# Patient Record
Sex: FEMALE
[De-identification: names, ages, dates, MRNs, and addresses within clinical notes are randomized per-mention and may not be internally consistent; named-entity substitution may affect disease eponyms.]

---

## 2019-03-02 ENCOUNTER — HOSPITAL ENCOUNTER (EMERGENCY)
Dept: HOSPITAL 88 - FSED | Age: 24
Discharge: HOME | End: 2019-03-02
Payer: COMMERCIAL

## 2019-03-02 VITALS — SYSTOLIC BLOOD PRESSURE: 105 MMHG | DIASTOLIC BLOOD PRESSURE: 77 MMHG

## 2019-03-02 VITALS — HEIGHT: 61 IN | WEIGHT: 163.37 LBS | BODY MASS INDEX: 30.84 KG/M2

## 2019-03-02 DIAGNOSIS — M54.2: Primary | ICD-10-CM
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
## 2019-03-02 DIAGNOSIS — S20.219A: ICD-10-CM

## 2019-03-02 DIAGNOSIS — M25.512: ICD-10-CM

## 2019-03-02 DIAGNOSIS — S16.1XXA: ICD-10-CM
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
## 2019-03-02 DIAGNOSIS — S63.522A: ICD-10-CM

## 2019-03-02 PROCEDURE — 81025 URINE PREGNANCY TEST: CPT

## 2019-03-02 PROCEDURE — 72040 X-RAY EXAM NECK SPINE 2-3 VW: CPT

## 2019-03-02 PROCEDURE — 71045 X-RAY EXAM CHEST 1 VIEW: CPT

## 2019-03-02 PROCEDURE — 99283 EMERGENCY DEPT VISIT LOW MDM: CPT
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
## 2019-03-02 NOTE — XMS REPORT
Patient Summary Document

                             Created on: 2019



DANISH VEGA

External Reference #: [id number]

: 1995

Sex: Female



Demographics







                          Address                   [address] [place]  [address]

 

                          Home Phone                [phone]

 

                          Preferred Language        Unknown

 

                          Marital Status            Unknown

 

                          [employer] Affiliation     Unknown

 

                          Race                      Unknown

 

                          Ethnic Group              Unknown





Author







                          Author                    [facility]

 

                          Address                   Unknown

 

                          Phone                     Unavailable







Support







                Name            Relationship    Address         Phone

 

                    ANABEL PENG    PRS                 [address] [place]  [address]                  [phone]

 

                    ANABEL BENITEZ        PRS                 [address] [place]  [address]                  [phone]







Care Team Providers







                    Care Team Member Name    Role                Phone

 

                          Unavailable               Unavailable







Payers







             Payer Name    Policy Type    Policy Number    Effective Date    Expiration Date







Problems

This patient has no known problems.



Allergies, Adverse Reactions, Alerts







          Allergy Name    Allergy Type    Status    Severity    Reaction(s)    Onset Date    Inactive 

Date                      Treating Clinician        Comments

 

        No Known Allergies    DA      Active    U               2018 00:00:00                     

 

        No Known Drug Intolerances    DA      Active    SV              2009-10-01 00:00:00                     

 

        No Known Intolerances    DA      Active    U               2009-10-01 00:00:00                     

 

        No Known Contrast Allergies    DA      Active    U               2008 00:00:00                     

 

        No Known Drug Allergies    DA      Active    U               2008 00:00:00                     

 

        No Known Food Allergies    DA      Active    U               2008 00:00:00                     

 

        No Known Other Allergies    DA      Active    U               2008 00:00:00                     







Medications

This patient has no known medications.

## 2022-01-11 ENCOUNTER — HOSPITAL ENCOUNTER (INPATIENT)
Dept: HOSPITAL 88 - ER | Age: 27
LOS: 3 days | Discharge: HOME | DRG: 419 | End: 2022-01-14
Attending: INTERNAL MEDICINE | Admitting: INTERNAL MEDICINE
Payer: COMMERCIAL

## 2022-01-11 VITALS — SYSTOLIC BLOOD PRESSURE: 111 MMHG | DIASTOLIC BLOOD PRESSURE: 65 MMHG

## 2022-01-11 VITALS — DIASTOLIC BLOOD PRESSURE: 65 MMHG | SYSTOLIC BLOOD PRESSURE: 111 MMHG

## 2022-01-11 VITALS — WEIGHT: 196 LBS | HEIGHT: 62 IN | BODY MASS INDEX: 36.07 KG/M2

## 2022-01-11 VITALS — DIASTOLIC BLOOD PRESSURE: 79 MMHG | SYSTOLIC BLOOD PRESSURE: 120 MMHG

## 2022-01-11 VITALS — SYSTOLIC BLOOD PRESSURE: 120 MMHG | DIASTOLIC BLOOD PRESSURE: 79 MMHG

## 2022-01-11 VITALS — SYSTOLIC BLOOD PRESSURE: 116 MMHG | DIASTOLIC BLOOD PRESSURE: 75 MMHG

## 2022-01-11 DIAGNOSIS — D51.9: ICD-10-CM

## 2022-01-11 DIAGNOSIS — E66.01: ICD-10-CM

## 2022-01-11 DIAGNOSIS — D52.9: ICD-10-CM

## 2022-01-11 DIAGNOSIS — E86.0: ICD-10-CM

## 2022-01-11 DIAGNOSIS — D64.9: ICD-10-CM

## 2022-01-11 DIAGNOSIS — K80.10: Primary | ICD-10-CM

## 2022-01-11 DIAGNOSIS — Z20.822: ICD-10-CM

## 2022-01-11 LAB
ALBUMIN SERPL-MCNC: 4 G/DL (ref 3.5–5)
ALBUMIN/GLOB SERPL: 1 {RATIO} (ref 0.8–2)
ALP SERPL-CCNC: 128 IU/L (ref 40–150)
ALT SERPL-CCNC: 149 IU/L (ref 0–55)
AMYLASE SERPL-CCNC: 48 U/L (ref 25–125)
ANION GAP SERPL CALC-SCNC: 15 MMOL/L (ref 8–16)
BACTERIA URNS QL MICRO: (no result) /HPF
BASOPHILS # BLD AUTO: 0.1 10*3/UL (ref 0–0.1)
BASOPHILS NFR BLD AUTO: 0.7 % (ref 0–1)
BUN SERPL-MCNC: 11 MG/DL (ref 7–26)
BUN/CREAT SERPL: 13 (ref 6–25)
CALCIUM SERPL-MCNC: 9.4 MG/DL (ref 8.4–10.2)
CHLORIDE SERPL-SCNC: 106 MMOL/L (ref 98–107)
CLARITY UR: (no result)
CO2 SERPL-SCNC: 23 MMOL/L (ref 22–29)
COLOR UR: (no result)
DEPRECATED NEUTROPHILS # BLD AUTO: 7 10*3/UL (ref 2.1–6.9)
DEPRECATED RBC URNS MANUAL-ACNC: (no result) /HPF (ref 0–5)
EGFRCR SERPLBLD CKD-EPI 2021: 81 ML/MIN (ref 60–?)
EOSINOPHIL # BLD AUTO: 0.2 10*3/UL (ref 0–0.4)
EOSINOPHIL NFR BLD AUTO: 1.8 % (ref 0–6)
EPI CELLS URNS QL MICRO: (no result) /LPF
ERYTHROCYTE [DISTWIDTH] IN CORD BLOOD: 13.5 % (ref 11.7–14.4)
GLOBULIN PLAS-MCNC: 4 G/DL (ref 2.3–3.5)
GLUCOSE SERPLBLD-MCNC: 91 MG/DL (ref 74–118)
HCT VFR BLD AUTO: 40.3 % (ref 34.2–44.1)
HGB BLD-MCNC: 12.7 G/DL (ref 12–16)
KETONES UR QL STRIP.AUTO: (no result)
LEUKOCYTE ESTERASE UR QL STRIP.AUTO: NEGATIVE
LIPASE SERPL-CCNC: 31 U/L (ref 8–78)
LYMPHOCYTES # BLD: 2.4 10*3/UL (ref 1–3.2)
LYMPHOCYTES NFR BLD AUTO: 23 % (ref 18–39.1)
MCH RBC QN AUTO: 27.1 PG (ref 28–32)
MCHC RBC AUTO-ENTMCNC: 31.5 G/DL (ref 31–35)
MCV RBC AUTO: 85.9 FL (ref 81–99)
MONOCYTES # BLD AUTO: 0.8 10*3/UL (ref 0.2–0.8)
MONOCYTES NFR BLD AUTO: 7.7 % (ref 4.4–11.3)
MUCOUS THREADS URNS QL MICRO: (no result)
NEUTS SEG NFR BLD AUTO: 66.5 % (ref 38.7–80)
NITRITE UR QL STRIP.AUTO: NEGATIVE
PLATELET # BLD AUTO: 348 X10E3/UL (ref 140–360)
POTASSIUM SERPL-SCNC: 4 MMOL/L (ref 3.5–5.1)
PROT UR QL STRIP.AUTO: (no result)
RBC # BLD AUTO: 4.69 X10E6/UL (ref 3.6–5.1)
SODIUM SERPL-SCNC: 140 MMOL/L (ref 136–145)
SP GR UR STRIP: 1.02 (ref 1.01–1.02)
UROBILINOGEN UR STRIP-MCNC: 2 MG/DL (ref 0.2–1)
WBC #/AREA URNS HPF: (no result) /HPF (ref 0–5)

## 2022-01-11 PROCEDURE — 81001 URINALYSIS AUTO W/SCOPE: CPT

## 2022-01-11 PROCEDURE — 74181 MRI ABDOMEN W/O CONTRAST: CPT

## 2022-01-11 PROCEDURE — 71260 CT THORAX DX C+: CPT

## 2022-01-11 PROCEDURE — 74018 RADEX ABDOMEN 1 VIEW: CPT

## 2022-01-11 PROCEDURE — 84702 CHORIONIC GONADOTROPIN TEST: CPT

## 2022-01-11 PROCEDURE — 96361 HYDRATE IV INFUSION ADD-ON: CPT

## 2022-01-11 PROCEDURE — 88304 TISSUE EXAM BY PATHOLOGIST: CPT

## 2022-01-11 PROCEDURE — 93005 ELECTROCARDIOGRAM TRACING: CPT

## 2022-01-11 PROCEDURE — 85025 COMPLETE CBC W/AUTO DIFF WBC: CPT

## 2022-01-11 PROCEDURE — 76705 ECHO EXAM OF ABDOMEN: CPT

## 2022-01-11 PROCEDURE — 83540 ASSAY OF IRON: CPT

## 2022-01-11 PROCEDURE — 83690 ASSAY OF LIPASE: CPT

## 2022-01-11 PROCEDURE — 84466 ASSAY OF TRANSFERRIN: CPT

## 2022-01-11 PROCEDURE — 84484 ASSAY OF TROPONIN QUANT: CPT

## 2022-01-11 PROCEDURE — 99284 EMERGENCY DEPT VISIT MOD MDM: CPT

## 2022-01-11 PROCEDURE — 82746 ASSAY OF FOLIC ACID SERUM: CPT

## 2022-01-11 PROCEDURE — 36415 COLL VENOUS BLD VENIPUNCTURE: CPT

## 2022-01-11 PROCEDURE — 85045 AUTOMATED RETICULOCYTE COUNT: CPT

## 2022-01-11 PROCEDURE — 0FT44ZZ RESECTION OF GALLBLADDER, PERCUTANEOUS ENDOSCOPIC APPROACH: ICD-10-PCS | Performed by: SURGERY

## 2022-01-11 PROCEDURE — 94799 UNLISTED PULMONARY SVC/PX: CPT

## 2022-01-11 PROCEDURE — 80053 COMPREHEN METABOLIC PANEL: CPT

## 2022-01-11 PROCEDURE — 82607 VITAMIN B-12: CPT

## 2022-01-11 PROCEDURE — 82150 ASSAY OF AMYLASE: CPT

## 2022-01-11 RX ADMIN — HYDROCODONE BITARTRATE AND ACETAMINOPHEN PRN EA: 5; 325 TABLET ORAL at 19:46

## 2022-01-11 RX ADMIN — DEXTROSE MONOHYDRATE SCH MLS/HR: 5 INJECTION INTRAVENOUS at 06:00

## 2022-01-11 RX ADMIN — SODIUM CHLORIDE SCH MLS/HR: 9 INJECTION, SOLUTION INTRAVENOUS at 20:31

## 2022-01-11 RX ADMIN — Medication PRN MG: at 17:17

## 2022-01-11 RX ADMIN — Medication PRN MG: at 19:46

## 2022-01-11 RX ADMIN — DEXTROSE MONOHYDRATE SCH MLS/HR: 5 INJECTION INTRAVENOUS at 05:37

## 2022-01-11 RX ADMIN — SODIUM CHLORIDE SCH MLS/HR: 9 INJECTION, SOLUTION INTRAVENOUS at 13:30

## 2022-01-11 RX ADMIN — SUCRALFATE SCH G: 1 SUSPENSION ORAL at 20:31

## 2022-01-11 RX ADMIN — DEXTROSE MONOHYDRATE SCH MLS/HR: 5 INJECTION INTRAVENOUS at 14:00

## 2022-01-11 RX ADMIN — DEXTROSE MONOHYDRATE SCH MLS/HR: 5 INJECTION INTRAVENOUS at 20:31

## 2022-01-11 RX ADMIN — SODIUM CHLORIDE SCH MLS/HR: 9 INJECTION, SOLUTION INTRAVENOUS at 05:37

## 2022-01-11 RX ADMIN — PANTOPRAZOLE SODIUM SCH MG: 40 TABLET, DELAYED RELEASE ORAL at 11:45

## 2022-01-11 RX ADMIN — SUCRALFATE SCH G: 1 SUSPENSION ORAL at 17:16

## 2022-01-11 RX ADMIN — PANTOPRAZOLE SODIUM SCH MG: 40 TABLET, DELAYED RELEASE ORAL at 17:16

## 2022-01-11 RX ADMIN — SODIUM CHLORIDE PRN MG: 900 INJECTION INTRAVENOUS at 17:17

## 2022-01-12 VITALS — DIASTOLIC BLOOD PRESSURE: 69 MMHG | SYSTOLIC BLOOD PRESSURE: 112 MMHG

## 2022-01-12 VITALS — SYSTOLIC BLOOD PRESSURE: 100 MMHG | DIASTOLIC BLOOD PRESSURE: 64 MMHG

## 2022-01-12 VITALS — DIASTOLIC BLOOD PRESSURE: 73 MMHG | SYSTOLIC BLOOD PRESSURE: 112 MMHG

## 2022-01-12 VITALS — DIASTOLIC BLOOD PRESSURE: 64 MMHG | SYSTOLIC BLOOD PRESSURE: 100 MMHG

## 2022-01-12 VITALS — SYSTOLIC BLOOD PRESSURE: 122 MMHG | DIASTOLIC BLOOD PRESSURE: 78 MMHG

## 2022-01-12 VITALS — SYSTOLIC BLOOD PRESSURE: 104 MMHG | DIASTOLIC BLOOD PRESSURE: 58 MMHG

## 2022-01-12 VITALS — SYSTOLIC BLOOD PRESSURE: 108 MMHG | DIASTOLIC BLOOD PRESSURE: 65 MMHG

## 2022-01-12 VITALS — DIASTOLIC BLOOD PRESSURE: 58 MMHG | SYSTOLIC BLOOD PRESSURE: 104 MMHG

## 2022-01-12 LAB
ALBUMIN SERPL-MCNC: 3.4 G/DL (ref 3.5–5)
ALBUMIN/GLOB SERPL: 1 {RATIO} (ref 0.8–2)
ALP SERPL-CCNC: 99 IU/L (ref 40–150)
ALT SERPL-CCNC: 141 IU/L (ref 0–55)
AMYLASE SERPL-CCNC: 52 U/L (ref 25–125)
ANION GAP SERPL CALC-SCNC: 9 MMOL/L (ref 8–16)
BASOPHILS # BLD AUTO: 0 10*3/UL (ref 0–0.1)
BASOPHILS NFR BLD AUTO: 0.3 % (ref 0–1)
BUN SERPL-MCNC: < 5 MG/DL (ref 7–26)
BUN/CREAT SERPL: 8 (ref 6–25)
CALCIUM SERPL-MCNC: 8.4 MG/DL (ref 8.4–10.2)
CHLORIDE SERPL-SCNC: 108 MMOL/L (ref 98–107)
CO2 SERPL-SCNC: 22 MMOL/L (ref 22–29)
DEPRECATED NEUTROPHILS # BLD AUTO: 9.2 10*3/UL (ref 2.1–6.9)
EGFRCR SERPLBLD CKD-EPI 2021: 115 ML/MIN (ref 60–?)
EOSINOPHIL # BLD AUTO: 0 10*3/UL (ref 0–0.4)
EOSINOPHIL NFR BLD AUTO: 0 % (ref 0–6)
ERYTHROCYTE [DISTWIDTH] IN CORD BLOOD: 13.1 % (ref 11.7–14.4)
GLOBULIN PLAS-MCNC: 3.4 G/DL (ref 2.3–3.5)
GLUCOSE SERPLBLD-MCNC: 94 MG/DL (ref 74–118)
HCT VFR BLD AUTO: 37.5 % (ref 34.2–44.1)
HGB BLD-MCNC: 11.9 G/DL (ref 12–16)
LIPASE SERPL-CCNC: 59 U/L (ref 8–78)
LYMPHOCYTES # BLD: 1.4 10*3/UL (ref 1–3.2)
LYMPHOCYTES NFR BLD AUTO: 12.4 % (ref 18–39.1)
MCH RBC QN AUTO: 26.9 PG (ref 28–32)
MCHC RBC AUTO-ENTMCNC: 31.7 G/DL (ref 31–35)
MCV RBC AUTO: 84.8 FL (ref 81–99)
MONOCYTES # BLD AUTO: 0.5 10*3/UL (ref 0.2–0.8)
MONOCYTES NFR BLD AUTO: 4.8 % (ref 4.4–11.3)
NEUTS SEG NFR BLD AUTO: 82.1 % (ref 38.7–80)
PLATELET # BLD AUTO: 314 X10E3/UL (ref 140–360)
POTASSIUM SERPL-SCNC: 4 MMOL/L (ref 3.5–5.1)
RBC # BLD AUTO: 4.42 X10E6/UL (ref 3.6–5.1)
SODIUM SERPL-SCNC: 135 MMOL/L (ref 136–145)

## 2022-01-12 RX ADMIN — SODIUM CHLORIDE SCH MLS/HR: 9 INJECTION, SOLUTION INTRAVENOUS at 02:38

## 2022-01-12 RX ADMIN — Medication PRN MG: at 06:04

## 2022-01-12 RX ADMIN — SODIUM CHLORIDE SCH MLS/HR: 9 INJECTION, SOLUTION INTRAVENOUS at 15:33

## 2022-01-12 RX ADMIN — SODIUM CHLORIDE PRN MG: 900 INJECTION INTRAVENOUS at 06:06

## 2022-01-12 RX ADMIN — SUCRALFATE SCH G: 1 SUSPENSION ORAL at 17:39

## 2022-01-12 RX ADMIN — DEXTROSE MONOHYDRATE SCH MLS/HR: 5 INJECTION INTRAVENOUS at 05:56

## 2022-01-12 RX ADMIN — HYDROCODONE BITARTRATE AND ACETAMINOPHEN PRN EA: 5; 325 TABLET ORAL at 03:38

## 2022-01-12 RX ADMIN — SODIUM CHLORIDE SCH MLS/HR: 9 INJECTION, SOLUTION INTRAVENOUS at 20:45

## 2022-01-12 RX ADMIN — SUCRALFATE SCH G: 1 SUSPENSION ORAL at 09:42

## 2022-01-12 RX ADMIN — Medication PRN MG: at 10:20

## 2022-01-12 RX ADMIN — DEXTROSE MONOHYDRATE SCH MLS/HR: 5 INJECTION INTRAVENOUS at 22:02

## 2022-01-12 RX ADMIN — SUCRALFATE SCH G: 1 SUSPENSION ORAL at 21:00

## 2022-01-12 RX ADMIN — PANTOPRAZOLE SODIUM SCH MG: 40 TABLET, DELAYED RELEASE ORAL at 09:42

## 2022-01-12 RX ADMIN — DEXTROSE MONOHYDRATE SCH MLS/HR: 5 INJECTION INTRAVENOUS at 15:33

## 2022-01-12 RX ADMIN — SUCRALFATE SCH G: 1 SUSPENSION ORAL at 15:33

## 2022-01-12 RX ADMIN — PANTOPRAZOLE SODIUM SCH MG: 40 TABLET, DELAYED RELEASE ORAL at 17:39

## 2022-01-13 VITALS — DIASTOLIC BLOOD PRESSURE: 76 MMHG | SYSTOLIC BLOOD PRESSURE: 111 MMHG

## 2022-01-13 VITALS — SYSTOLIC BLOOD PRESSURE: 119 MMHG | DIASTOLIC BLOOD PRESSURE: 76 MMHG

## 2022-01-13 VITALS — SYSTOLIC BLOOD PRESSURE: 111 MMHG | DIASTOLIC BLOOD PRESSURE: 69 MMHG

## 2022-01-13 VITALS — DIASTOLIC BLOOD PRESSURE: 63 MMHG | SYSTOLIC BLOOD PRESSURE: 110 MMHG

## 2022-01-13 VITALS — SYSTOLIC BLOOD PRESSURE: 120 MMHG | DIASTOLIC BLOOD PRESSURE: 64 MMHG

## 2022-01-13 VITALS — SYSTOLIC BLOOD PRESSURE: 110 MMHG | DIASTOLIC BLOOD PRESSURE: 63 MMHG

## 2022-01-13 VITALS — DIASTOLIC BLOOD PRESSURE: 76 MMHG | SYSTOLIC BLOOD PRESSURE: 119 MMHG

## 2022-01-13 LAB
ALBUMIN SERPL-MCNC: 3 G/DL (ref 3.5–5)
ALBUMIN/GLOB SERPL: 1 {RATIO} (ref 0.8–2)
ALP SERPL-CCNC: 88 IU/L (ref 40–150)
ALT SERPL-CCNC: 89 IU/L (ref 0–55)
AMYLASE SERPL-CCNC: 45 U/L (ref 25–125)
ANION GAP SERPL CALC-SCNC: 5.8 MMOL/L (ref 8–16)
BASOPHILS # BLD AUTO: 0.1 10*3/UL (ref 0–0.1)
BASOPHILS NFR BLD AUTO: 0.6 % (ref 0–1)
BUN SERPL-MCNC: 7 MG/DL (ref 7–26)
BUN/CREAT SERPL: 10 (ref 6–25)
CALCIUM SERPL-MCNC: 8 MG/DL (ref 8.4–10.2)
CHLORIDE SERPL-SCNC: 110 MMOL/L (ref 98–107)
CO2 SERPL-SCNC: 26 MMOL/L (ref 22–29)
DEPRECATED NEUTROPHILS # BLD AUTO: 5.2 10*3/UL (ref 2.1–6.9)
EGFRCR SERPLBLD CKD-EPI 2021: 106 ML/MIN (ref 60–?)
EOSINOPHIL # BLD AUTO: 0.3 10*3/UL (ref 0–0.4)
EOSINOPHIL NFR BLD AUTO: 3.1 % (ref 0–6)
ERYTHROCYTE [DISTWIDTH] IN CORD BLOOD: 13.6 % (ref 11.7–14.4)
GLOBULIN PLAS-MCNC: 3.1 G/DL (ref 2.3–3.5)
GLUCOSE SERPLBLD-MCNC: 113 MG/DL (ref 74–118)
HCT VFR BLD AUTO: 33.3 % (ref 34.2–44.1)
HGB BLD-MCNC: 10.6 G/DL (ref 12–16)
IRON SATN MFR SERPL: 8 % (ref 15–50)
IRON SERPL-MCNC: 29 UG/DL (ref 50–170)
LIPASE SERPL-CCNC: 34 U/L (ref 8–78)
LYMPHOCYTES # BLD: 3.6 10*3/UL (ref 1–3.2)
LYMPHOCYTES NFR BLD AUTO: 37.1 % (ref 18–39.1)
MCH RBC QN AUTO: 27.3 PG (ref 28–32)
MCHC RBC AUTO-ENTMCNC: 31.8 G/DL (ref 31–35)
MCV RBC AUTO: 85.8 FL (ref 81–99)
MONOCYTES # BLD AUTO: 0.6 10*3/UL (ref 0.2–0.8)
MONOCYTES NFR BLD AUTO: 6.1 % (ref 4.4–11.3)
NEUTS SEG NFR BLD AUTO: 52.8 % (ref 38.7–80)
PLATELET # BLD AUTO: 268 X10E3/UL (ref 140–360)
POTASSIUM SERPL-SCNC: 3.8 MMOL/L (ref 3.5–5.1)
RBC # BLD AUTO: 3.88 X10E6/UL (ref 3.6–5.1)
SODIUM SERPL-SCNC: 138 MMOL/L (ref 136–145)
TIBC SERPL-MCNC: 351 UG/DL (ref 261–478)
TRANSFERRIN SERPL-MCNC: 251 MG/DL (ref 180–382)

## 2022-01-13 RX ADMIN — SUCRALFATE SCH G: 1 SUSPENSION ORAL at 07:30

## 2022-01-13 RX ADMIN — SODIUM CHLORIDE SCH MLS/HR: 9 INJECTION, SOLUTION INTRAVENOUS at 04:14

## 2022-01-13 RX ADMIN — Medication PRN MG: at 00:32

## 2022-01-13 RX ADMIN — DEXTROSE MONOHYDRATE SCH MLS/HR: 5 INJECTION INTRAVENOUS at 21:28

## 2022-01-13 RX ADMIN — DEXTROSE MONOHYDRATE SCH MLS/HR: 5 INJECTION INTRAVENOUS at 05:20

## 2022-01-13 RX ADMIN — DEXTROSE MONOHYDRATE SCH MLS/HR: 5 INJECTION INTRAVENOUS at 17:13

## 2022-01-13 RX ADMIN — PANTOPRAZOLE SODIUM SCH MG: 40 TABLET, DELAYED RELEASE ORAL at 17:13

## 2022-01-13 RX ADMIN — SUCRALFATE SCH G: 1 SUSPENSION ORAL at 11:30

## 2022-01-13 RX ADMIN — SUCRALFATE SCH G: 1 SUSPENSION ORAL at 21:28

## 2022-01-13 RX ADMIN — SUCRALFATE SCH G: 1 SUSPENSION ORAL at 17:13

## 2022-01-13 RX ADMIN — PANTOPRAZOLE SODIUM SCH MG: 40 TABLET, DELAYED RELEASE ORAL at 07:30

## 2022-01-14 VITALS — DIASTOLIC BLOOD PRESSURE: 72 MMHG | SYSTOLIC BLOOD PRESSURE: 107 MMHG

## 2022-01-14 VITALS — DIASTOLIC BLOOD PRESSURE: 67 MMHG | SYSTOLIC BLOOD PRESSURE: 102 MMHG

## 2022-01-14 VITALS — SYSTOLIC BLOOD PRESSURE: 112 MMHG | DIASTOLIC BLOOD PRESSURE: 71 MMHG

## 2022-01-14 VITALS — DIASTOLIC BLOOD PRESSURE: 69 MMHG | SYSTOLIC BLOOD PRESSURE: 100 MMHG

## 2022-01-14 VITALS — SYSTOLIC BLOOD PRESSURE: 123 MMHG | DIASTOLIC BLOOD PRESSURE: 84 MMHG

## 2022-01-14 RX ADMIN — PANTOPRAZOLE SODIUM SCH MG: 40 TABLET, DELAYED RELEASE ORAL at 11:10

## 2022-01-14 RX ADMIN — SUCRALFATE SCH G: 1 SUSPENSION ORAL at 11:17

## 2022-01-14 RX ADMIN — SUCRALFATE SCH G: 1 SUSPENSION ORAL at 11:11

## 2022-01-14 RX ADMIN — DEXTROSE MONOHYDRATE SCH MLS/HR: 5 INJECTION INTRAVENOUS at 05:58
